# Patient Record
Sex: FEMALE | Race: BLACK OR AFRICAN AMERICAN | NOT HISPANIC OR LATINO | Employment: OTHER | URBAN - METROPOLITAN AREA
[De-identification: names, ages, dates, MRNs, and addresses within clinical notes are randomized per-mention and may not be internally consistent; named-entity substitution may affect disease eponyms.]

---

## 2017-05-12 DIAGNOSIS — J30.1 SEASONAL ALLERGIC RHINITIS DUE TO POLLEN: ICD-10-CM

## 2017-05-12 RX ORDER — FLUTICASONE PROPIONATE 50 MCG
SPRAY, SUSPENSION (ML) NASAL
Qty: 16 ML | Refills: 0 | OUTPATIENT
Start: 2017-05-12

## 2017-08-24 ENCOUNTER — OFFICE VISIT (OUTPATIENT)
Dept: FAMILY MEDICINE | Facility: CLINIC | Age: 57
End: 2017-08-24

## 2017-08-24 VITALS
HEART RATE: 76 BPM | HEIGHT: 65 IN | TEMPERATURE: 98.2 F | OXYGEN SATURATION: 97 % | BODY MASS INDEX: 24.32 KG/M2 | WEIGHT: 146 LBS | DIASTOLIC BLOOD PRESSURE: 70 MMHG | RESPIRATION RATE: 14 BRPM | SYSTOLIC BLOOD PRESSURE: 110 MMHG

## 2017-08-24 DIAGNOSIS — Z12.31 ENCOUNTER FOR SCREENING MAMMOGRAM FOR BREAST CANCER: ICD-10-CM

## 2017-08-24 DIAGNOSIS — J30.1 CHRONIC SEASONAL ALLERGIC RHINITIS DUE TO POLLEN: ICD-10-CM

## 2017-08-24 DIAGNOSIS — Z13.1 SCREENING FOR DIABETES MELLITUS: ICD-10-CM

## 2017-08-24 DIAGNOSIS — Z01.419 ENCOUNTER FOR GYNECOLOGICAL EXAMINATION WITHOUT ABNORMAL FINDING: Primary | ICD-10-CM

## 2017-08-24 DIAGNOSIS — Z11.59 NEED FOR HEPATITIS C SCREENING TEST: ICD-10-CM

## 2017-08-24 DIAGNOSIS — Z13.220 SCREENING CHOLESTEROL LEVEL: ICD-10-CM

## 2017-08-24 LAB
GLUCOSE SERPL-MCNC: 96 MG/DL (ref 60–99)
HEMOGLOBIN: 13 G/DL (ref 11.7–15.7)
MAMMOGRAM: NORMAL

## 2017-08-24 PROCEDURE — 36415 COLL VENOUS BLD VENIPUNCTURE: CPT | Performed by: FAMILY MEDICINE

## 2017-08-24 PROCEDURE — 86803 HEPATITIS C AB TEST: CPT | Mod: 90 | Performed by: FAMILY MEDICINE

## 2017-08-24 PROCEDURE — 80061 LIPID PANEL: CPT | Mod: 90 | Performed by: FAMILY MEDICINE

## 2017-08-24 PROCEDURE — 99396 PREV VISIT EST AGE 40-64: CPT | Performed by: FAMILY MEDICINE

## 2017-08-24 PROCEDURE — 85018 HEMOGLOBIN: CPT | Performed by: FAMILY MEDICINE

## 2017-08-24 PROCEDURE — 82947 ASSAY GLUCOSE BLOOD QUANT: CPT | Performed by: FAMILY MEDICINE

## 2017-08-24 RX ORDER — FLUTICASONE PROPIONATE 50 MCG
SPRAY, SUSPENSION (ML) NASAL
Qty: 3 BOTTLE | Refills: 3 | Status: SHIPPED | OUTPATIENT
Start: 2017-08-24 | End: 2018-08-20

## 2017-08-24 NOTE — MR AVS SNAPSHOT
After Visit Summary   8/24/2017    Jaki Lopez    MRN: [id number]           Patient Information     Date Of Birth          1960        Visit Information        Provider Department      8/24/2017 11:30 AM Luciana Palma MD [facility]        Today's Diagnoses     Encounter for gynecological examination without abnormal finding    -  1    Chronic seasonal allergic rhinitis due to pollen        Encounter for screening mammogram for breast cancer        Screening for diabetes mellitus        Screening cholesterol level        Need for hepatitis C screening test          Care Instructions    Total calcium intake of 1500 mgm/day, vitamin D 400-800IU/day and a regular weight bearing exercise program for prevention of osteoporosis is recommended treatment at this time.    Await labs            Follow-ups after your visit        Additional Services     RADIOLOGY REFERRAL       Your provider has referred you to: N: [facility] [place] [phone]   [email or web address]    Please be aware that coverage of these services is subject to the terms and limitations of your health insurance plan.  Call member services at your health plan with any benefit or coverage questions.      Please bring the following to your appointment:    >>   Any x-rays, CTs or MRIs which have been performed.  Contact the facility where they were done to arrange for  prior to your scheduled appointment.    >>   List of current medications   >>   This referral request   >>   Any documents/labs given to you for this referral    Prior authorization is required for MRI/MRA, CT, Dexa Scans and Worker's Compensation cases.                  Follow-up notes from your care team     Return in about 1 year (around 8/24/2018).      Future tests that were ordered for you today     Open Future Orders        Priority Expected Expires Ordered    Mammo Screening digital (bilat) Routine  8/24/2018 8/24/2017  "           Who to contact     If you have questions or need follow up information about today's clinic visit or your schedule please contact [facility] directly at [phone].  Normal or non-critical lab and imaging results will be communicated to you by MyChart, letter or phone within 4 business days after the clinic has received the results. If you do not hear from us within 7 days, please contact the clinic through MyChart or phone. If you have a critical or abnormal lab result, we will notify you by phone as soon as possible.  Submit refill requests through NONO or call your pharmacy and they will forward the refill request to us. Please allow 3 business days for your refill to be completed.          Additional Information About Your Visit        [employer] Information     NONO gives you secure access to your electronic health record. If you see a primary care provider, you can also send messages to your care team and make appointments. If you have questions, please call your primary care clinic.  If you do not have a primary care provider, please call [phone] and they will assist you.        Care EveryWhere ID     This is your Care EveryWhere ID. This could be used by other organizations to access your [facility] medical records  [id number]        Your Vitals Were     Pulse Temperature Respirations Height Pulse Oximetry BMI (Body Mass Index)    76 98.2  F (36.8  C) (Oral) 14 1.657 m (5' 5.25\") 97% 24.11 kg/m2       Blood Pressure from Last 3 Encounters:   08/24/17 110/70   08/20/15 110/70   08/18/14 110/70    Weight from Last 3 Encounters:   08/24/17 66.2 kg (146 lb)   08/20/15 65.8 kg (145 lb)   08/18/14 64.4 kg (142 lb)              We Performed the Following     CL AFF HEMOGLOBIN (BFP)     Glucose Fasting (BFP)     Hepatits C antibody (QUEST)     Lipid Profile (QUEST)     RADIOLOGY REFERRAL     VENOUS COLLECTION          Today's Medication Changes          These changes are " accurate as of: 8/24/17 12:30 PM.  If you have any questions, ask your nurse or doctor.               These medicines have changed or have updated prescriptions.        Dose/Directions    fluticasone 50 MCG/ACT spray   Commonly known as:  FLONASE   This may have changed:  See the new instructions.   Used for:  Chronic seasonal allergic rhinitis due to pollen   Changed by:  Luciana Palma MD        USE 2 SPRAYS IN EACH NOSTRIL EVERY DAY   Quantity:  3 Bottle   Refills:  3            Where to get your medicines      These medications were sent to [id number] - [place] - [address] AT [facility]  [address], [place] [address]     Phone:  [phone]     fluticasone 50 MCG/ACT spray                Primary Care Provider Office Phone # Fax #    Luciana Palma [phone] [address] [address]        Equal Access to Services     [facility]: Hadii aad ku hadasho Soomaali, waaxda luqadaha, qaybta kaalmada adeegyada, waxay idiin haymithcn snehal nam . So [facility] [phone].    ATENCIÓN: Si habla español, tiene a guerrero disposición servicios gratuitos de asistencia lingüística. [facility] [phone].    We comply with applicable federal civil rights laws and [place] laws. We do not discriminate on the basis of race, color, national origin, age, disability sex, sexual orientation or gender identity.            Thank you!     Thank you for choosing [facility]  for your care. Our goal is always to provide you with excellent care. Hearing back from our patients is one way we can continue to improve our services. Please take a few minutes to complete the written survey that you may receive in the mail after your visit with us. Thank you!             Your Updated Medication List - Protect others around you: Learn how to safely use, store and throw away your medicines at www.disposemymeds.org.           This list is accurate as of: 8/24/17 12:30 PM.  Always use your most recent med list.                   Brand Name Dispense Instructions for use Diagnosis    calcium carbonate-vitamin D 600-400 MG-UNIT Chew    CALTRATE 600+D     Take 1 chew tab by mouth 2 times daily.    Symptomatic menopausal or female climacteric states       fluticasone 50 MCG/ACT spray    FLONASE    3 Bottle    USE 2 SPRAYS IN EACH NOSTRIL EVERY DAY    Chronic seasonal allergic rhinitis due to pollen       vitamin E 400 UNIT capsule     3 MONTHS    ONE CAPSULE DAILY    Fibroadenosis of breast

## 2017-08-24 NOTE — PATIENT INSTRUCTIONS
Total calcium intake of 1500 mgm/day, vitamin D 400-800IU/day and a regular weight bearing exercise program for prevention of osteoporosis is recommended treatment at this time.    Await labs

## 2017-08-24 NOTE — NURSING NOTE
Patient is here for a full physical exam and pap.  Pre-Visit Screening :  Immunizations : up to date    Colonoscopy : is up to date  Mammogram : is up to date  Asthma Action Plan/Test : na  PHQ9/GAD7 : na  Pulse - regular  Medication Reconciliation: complete    BP done on the right arm, with a lg sized cuff.  Pulse - regular  My Chart - accepts    CLASSIFICATION OF OVERWEIGHT AND OBESITY BY BMI                         Obesity Class           BMI(kg/m2)  Underweight                                    < 18.5  Normal                                         18.5-24.9  Overweight                                     25.0-29.9  OBESITY                     I                  30.0-34.9                              II                 35.0-39.9  EXTREME OBESITY             III                >40                             Patient's  BMI Body mass index is 24.11 kg/(m^2).  http://hin.nhlbi.nih.gov/menuplanner/menu.cgi  Questioned patient about current smoking habits.  Pt. has never smoked.  ETOH screening:  Questions:  1-How often do you have a drink containing alcohol?                             2 times per month(s)  2-How many drinks containing alcohol do you have on a typical day when you are         Drinking?                              1   3- How often do you have 5 or more drinks on one occasion?                              never per never    Have you ever:  None of the patient's responses to the CAGE screening were positive / Negative CAGE score

## 2017-08-24 NOTE — PROGRESS NOTES
"SUBJECTIVE:  Jaki Lopez is an 56 year old  postmenopausal woman   who presents for annual gyn exam. Menopause at age 49. No   bleeding, spotting, or discharge noted.     Estrogen replacement therapy: never  MILTON exposure: no  History of abnormal Pap smear: No  Family history of uterine or ovarian cancer: No  Regular self breast exam: Yes  History of abnormal mammogram: Yes: benign adenoma  Family history of breast cancer: No  History of abnormal lipids: No    No past medical history on file.      Family History    Hypertension Father        Past Surgical History:  : BIOPSY OF BREAST, INCISIONAL     Comment: benign adenoma  3/2005: C STAPEDECTOMY     Comment: right    Current Outpatient Prescriptions:  fluticasone (FLONASE) 50 MCG/ACT nasal spray   calcium carbonate-vitamin D (CALTRATE 600+D) 600-400 MG-UNIT CHEW   VITAMIN E 400 UNIT OR CAPS   No current facility-administered medications for this visit.    -- No Known Allergies        Smoking status: Never Smoker    Smokeless tobacco: Never Used    Alcohol use Yes  0.0 oz/week    0 drink(s) per week            Review Of Systems  Ears/Nose/Throat: allergies  Respiratory: No shortness of breath, dyspnea on exertion, cough, or hemoptysis  Cardiovascular: negative  Gastrointestinal: negative  Genitourinary: negative    OBJECTIVE:  /70 (BP Location: Right arm, Cuff Size: Adult Large)  Pulse 76  Temp 98.2  F (36.8  C) (Oral)  Resp 14  Ht 1.657 m (5' 5.25\")  Wt 66.2 kg (146 lb)  SpO2 97%  BMI 24.11 kg/m2  General appearance: healthy, alert, no distress, cooperative and smiling  Skin: Skin color, texture, turgor normal. No rashes or lesions.  Ears: negative  Nose/Sinuses: Nares normal. Septum midline. Mucosa normal. No drainage or sinus tenderness.  Oropharynx: Lips, mucosa, and tongue normal. Teeth and gums normal.  Neck: Neck supple. No adenopathy. Thyroid symmetric, normal size,, Carotids without bruits.  Lungs: negative, Percussion normal. Good " diaphragmatic excursion. Lungs clear  Heart: negative, PMI normal. No lifts, heaves, or thrills. RRR. No murmurs, clicks gallops or rub  Breasts: Inspection negative. No nipple discharge or bleeding. No masses.  Abdomen: Abdomen soft, non-tender. BS normal. No masses, organomegaly  Pelvic: External genitalia and vagina normal. Bimanual and rectovaginal normal., positive findings:  vaginal mucosa atrophy  BMI : Body mass index is 24.11 kg/(m^2).    ASSESSMENT:(Z01.419) Encounter for gynecological examination without abnormal finding  (primary encounter diagnosis)  Plan: CL AFF HEMOGLOBIN (BFP), VENOUS COLLECTION        Total calcium intake of 1500 mgm/day, vitamin D 400-800IU/day and a regular weight bearing exercise program for prevention of osteoporosis is recommended treatment at this time.      (J30.1) Chronic seasonal allergic rhinitis due to pollen  Plan: fluticasone (FLONASE) 50 MCG/ACT spray        refilled    (Z12.31) Encounter for screening mammogram for breast cancer  Plan: RADIOLOGY REFERRAL, Mammo Screening digital         (bilat)            (Z13.1) Screening for diabetes mellitus  Plan: Glucose Fasting (BFP), VENOUS COLLECTION            (Z13.220) Screening cholesterol level  Plan: Lipid Profile (QUEST), VENOUS COLLECTION            (Z11.59) Need for hepatitis C screening test  Plan: VENOUS COLLECTION, Hepatits C antibody (QUEST)                Dx:  1)  Pap smear, mammogram  2)  Lipids at appropriate intervals    PE:  Reviewed health maintenance including diet, regular exercise,   estrogen replacement and periodic exams.

## 2017-08-25 LAB
CHOLEST SERPL-MCNC: 174 MG/DL
CHOLEST/HDLC SERPL: 2.6 (CALC)
HCV AB - QUEST: NORMAL
HDLC SERPL-MCNC: 67 MG/DL
LDLC SERPL CALC-MCNC: 93 MG/DL (CALC)
NONHDLC SERPL-MCNC: 107 MG/DL (CALC)
SIGNAL TO CUT OFF - QUEST: 0.03
TRIGL SERPL-MCNC: 58 MG/DL

## 2018-08-20 DIAGNOSIS — J30.1 CHRONIC SEASONAL ALLERGIC RHINITIS DUE TO POLLEN: ICD-10-CM

## 2018-08-20 RX ORDER — FLUTICASONE PROPIONATE 50 MCG
SPRAY, SUSPENSION (ML) NASAL
Qty: 1 BOTTLE | Refills: 0 | COMMUNITY
Start: 2018-08-20 | End: 2018-12-10

## 2018-08-20 RX ORDER — FLUTICASONE PROPIONATE 50 MCG
SPRAY, SUSPENSION (ML) NASAL
Qty: 48 ML | Refills: 0 | OUTPATIENT
Start: 2018-08-20

## 2018-08-20 NOTE — TELEPHONE ENCOUNTER
Ok refill of one month of fluticasone called into [facility]. Pt needs non fasting OV for refills.    [phone]

## 2018-10-26 ENCOUNTER — HEALTH MAINTENANCE LETTER (OUTPATIENT)
Age: 58
End: 2018-10-26

## 2018-11-01 ENCOUNTER — TRANSFERRED RECORDS (OUTPATIENT)
Dept: FAMILY MEDICINE | Facility: CLINIC | Age: 58
End: 2018-11-01

## 2018-12-10 ENCOUNTER — OFFICE VISIT (OUTPATIENT)
Dept: FAMILY MEDICINE | Facility: CLINIC | Age: 58
End: 2018-12-10

## 2018-12-10 VITALS
BODY MASS INDEX: 25.39 KG/M2 | WEIGHT: 152.4 LBS | OXYGEN SATURATION: 98 % | RESPIRATION RATE: 16 BRPM | HEIGHT: 65 IN | SYSTOLIC BLOOD PRESSURE: 116 MMHG | HEART RATE: 58 BPM | TEMPERATURE: 97.7 F | DIASTOLIC BLOOD PRESSURE: 72 MMHG

## 2018-12-10 DIAGNOSIS — Z12.4 ENCOUNTER FOR SCREENING FOR CERVICAL CANCER: ICD-10-CM

## 2018-12-10 DIAGNOSIS — Z13.220 SCREENING CHOLESTEROL LEVEL: ICD-10-CM

## 2018-12-10 DIAGNOSIS — Z13.1 SCREENING FOR DIABETES MELLITUS: ICD-10-CM

## 2018-12-10 DIAGNOSIS — J30.1 CHRONIC SEASONAL ALLERGIC RHINITIS DUE TO POLLEN: ICD-10-CM

## 2018-12-10 DIAGNOSIS — Z01.411 ENCOUNTER FOR GYNECOLOGICAL EXAMINATION WITH ABNORMAL FINDING: Primary | ICD-10-CM

## 2018-12-10 LAB
GLUCOSE SERPL-MCNC: 85 MG/DL (ref 60–99)
HEMOGLOBIN: 13.1 G/DL (ref 11.7–15.7)

## 2018-12-10 PROCEDURE — 36415 COLL VENOUS BLD VENIPUNCTURE: CPT | Performed by: FAMILY MEDICINE

## 2018-12-10 PROCEDURE — 88142 CYTOPATH C/V THIN LAYER: CPT | Mod: 90 | Performed by: FAMILY MEDICINE

## 2018-12-10 PROCEDURE — 80061 LIPID PANEL: CPT | Mod: 90 | Performed by: FAMILY MEDICINE

## 2018-12-10 PROCEDURE — 82947 ASSAY GLUCOSE BLOOD QUANT: CPT | Performed by: FAMILY MEDICINE

## 2018-12-10 PROCEDURE — 99396 PREV VISIT EST AGE 40-64: CPT | Performed by: FAMILY MEDICINE

## 2018-12-10 PROCEDURE — 85018 HEMOGLOBIN: CPT | Performed by: FAMILY MEDICINE

## 2018-12-10 RX ORDER — FLUTICASONE PROPIONATE 50 MCG
SPRAY, SUSPENSION (ML) NASAL
Qty: 3 BOTTLE | Refills: 3 | Status: SHIPPED | OUTPATIENT
Start: 2018-12-10 | End: 2018-12-10

## 2018-12-10 RX ORDER — INFLUENZA VIRUS VACCINE 15; 15; 15; 15 UG/.5ML; UG/.5ML; UG/.5ML; UG/.5ML
SUSPENSION INTRAMUSCULAR
Refills: 0 | COMMUNITY
Start: 2018-11-07 | End: 2020-07-22

## 2018-12-10 RX ORDER — FLUTICASONE PROPIONATE 50 MCG
SPRAY, SUSPENSION (ML) NASAL
Qty: 3 BOTTLE | Refills: 3 | Status: SHIPPED | OUTPATIENT
Start: 2018-12-10 | End: 2020-07-22

## 2018-12-10 SDOH — ECONOMIC STABILITY: FOOD INSECURITY: WITHIN THE PAST 12 MONTHS, THE FOOD YOU BOUGHT JUST DIDN'T LAST AND YOU DIDN'T HAVE MONEY TO GET MORE.: NEVER TRUE

## 2018-12-10 SDOH — ECONOMIC STABILITY: INCOME INSECURITY: HOW HARD IS IT FOR YOU TO PAY FOR THE VERY BASICS LIKE FOOD, HOUSING, MEDICAL CARE, AND HEATING?: NOT HARD AT ALL

## 2018-12-10 SDOH — ECONOMIC STABILITY: TRANSPORTATION INSECURITY
IN THE PAST 12 MONTHS, HAS LACK OF TRANSPORTATION KEPT YOU FROM MEETINGS, WORK, OR FROM GETTING THINGS NEEDED FOR DAILY LIVING?: NO

## 2018-12-10 SDOH — ECONOMIC STABILITY: FOOD INSECURITY: WITHIN THE PAST 12 MONTHS, YOU WORRIED THAT YOUR FOOD WOULD RUN OUT BEFORE YOU GOT MONEY TO BUY MORE.: NEVER TRUE

## 2018-12-10 SDOH — ECONOMIC STABILITY: TRANSPORTATION INSECURITY
IN THE PAST 12 MONTHS, HAS THE LACK OF TRANSPORTATION KEPT YOU FROM MEDICAL APPOINTMENTS OR FROM GETTING MEDICATIONS?: NO

## 2018-12-10 ASSESSMENT — MIFFLIN-ST. JEOR: SCORE: 1262.16

## 2018-12-10 NOTE — PATIENT INSTRUCTIONS
Total calcium intake of 1500 mgm/day, vitamin D 400-800IU/day and a regular weight bearing exercise program for prevention of osteoporosis is recommended treatment at this time.    Recheck 1 year

## 2018-12-10 NOTE — NURSING NOTE
Patient is here for a full physical exam.    Pre-Visit Screening :  Immunizations : up to date  Colon Screening : is up to date  Mammogram: up to date  Asthma Action Test/Plan : NA  PHQ9 :  PHQ 2 done today  GAD7 :  NA  Patient's  BMI Body mass index is 25.36 kg/m .  Questioned patient about current smoking habits.  Pt. has never smoked.  OK to leave a detailed voice message regarding today's visit Yes, phone # [phone]      ETOH screening:  Questions:  1-How often do you have a drink containing alcohol?                             2 times per month  2-How many drinks containing alcohol do you have on a typical day when you are         Drinking?                              1   3- How often do you have 5 or more drinks on one occasion?                              Never

## 2018-12-10 NOTE — PROGRESS NOTES
"SUBJECTIVE:  Jaki Lopez is an 58 year old  postmenopausal woman   who presents for annual gyn exam. Menopause at age 49. No   bleeding, spotting, or discharge noted.     Estrogen replacement therapy: never  MILTON exposure: no  History of abnormal Pap smear: No  Family history of uterine or ovarian cancer: No  Regular self breast exam: Yes  History of abnormal mammogram: Yes: benign adenoma  Family history of breast cancer: No  History of abnormal lipids: No    Past Medical History:  No date: Allergic rhinitis due to pollen  2013: Atrophic vaginitis    Review of patient's family history indicates:  Problem: Hypertension     Relation: Father         Age of Onset: (Not Specified)      Past Surgical History:  : BIOPSY OF BREAST, INCISIONAL     Comment:  benign adenoma  3/2005: C STAPEDECTOMY     Comment:  right    Current Outpatient Medications:  calcium carbonate-vitamin D (CALTRATE 600+D) 600-400 MG-UNIT CHEW   fluticasone (FLONASE) 50 MCG/ACT spray   VITAMIN E 400 UNIT OR CAPS   FLUARIX QUADRIVALENT 0.5 ML injection   No current facility-administered medications for this visit.    -- No Known Allergies     Social History   Tobacco Use     Smoking status: Never Smoker     Smokeless tobacco: Never Used   Alcohol use: Yes     Alcohol/week: 0.0 oz      Review Of Systems  Ears/Nose/Throat: allergies year around  Respiratory: No shortness of breath, dyspnea on exertion, cough, or hemoptysis  Cardiovascular: negative  Gastrointestinal: negative  Genitourinary: negative    OBJECTIVE:  /72 (BP Location: Right arm, Patient Position: Sitting, Cuff Size: Adult Large)   Pulse 58   Temp 97.7  F (36.5  C) (Oral)   Ht 1.651 m (5' 5\")   Wt 69.1 kg (152 lb 6.4 oz)   SpO2 98%   BMI 25.36 kg/m    General appearance: healthy, alert, no distress, cooperative and smiling  Skin: Skin color, texture, turgor normal. No rashes or lesions.  Ears: negative  Nose/Sinuses: Nares normal. Septum midline. Mucosa normal. No " drainage or sinus tenderness.  Oropharynx: Lips, mucosa, and tongue normal. Teeth and gums normal.  Neck: Neck supple. No adenopathy. Thyroid symmetric, normal size,, Carotids without bruits.  Lungs: negative, Percussion normal. Good diaphragmatic excursion. Lungs clear  Heart: negative, PMI normal. No lifts, heaves, or thrills. RRR. No murmurs, clicks gallops or rub  Breasts: Inspection negative. No nipple discharge or bleeding. No masses.  Abdomen: Abdomen soft, non-tender. BS normal. No masses, organomegaly  Pelvic: External genitalia and vagina normal. Bimanual and rectovaginal normal., positive findings:  vaginal mucosa atrophy  BMI : Body mass index is 25.36 kg/m .    ASSESSMENT:  (Z12.4) Encounter for screening for cervical cancer   (primary encounter diagnosis)  Plan: ThinPrep Pap and HPV (mRNA E6/E7)         ([facility])            (Z01.411) Encounter for gynecological examination with abnormal finding  Plan: VENIPUNC FNGR,HEEL,EAR [[id number]], HEMOGLOBIN         [79345.000]        Total calcium intake of 1500 mgm/day, vitamin D 400-800IU/day and a regular weight bearing exercise program for prevention of osteoporosis is recommended treatment at this time.  '    (Z13.1) Screening for diabetes mellitus  Plan: Glucose Fasting (BFP), VENIPUNC FNGR,HEEL,EAR         [14922]            (Z13.220) Screening cholesterol level  Plan: VENIPUNC FNGR,HEEL,EAR [[id number]], LIPID PANEL            (J30.1) Chronic seasonal allergic rhinitis due to pollen  Plan: fluticasone (FLONASE) 50 MCG/ACT nasal spray,         DISCONTINUED: fluticasone (FLONASE) 50 MCG/ACT         nasal spray        Refilled new pharmacy      Dx:  1)  Pap smear, mammogram  2)  Lipids at appropriate intervals    PE:  Reviewed health maintenance including diet, regular exercise,   estrogen replacement and periodic exams.

## 2018-12-11 LAB
CHOLEST SERPL-MCNC: 188 MG/DL
CHOLEST/HDLC SERPL: 2.8 (CALC)
HDLC SERPL-MCNC: 67 MG/DL
LDLC SERPL CALC-MCNC: 104 MG/DL (CALC)
NONHDLC SERPL-MCNC: 121 MG/DL (CALC)
TRIGL SERPL-MCNC: 78 MG/DL

## 2018-12-12 LAB
CLINICAL HISTORY - QUEST: NORMAL
COMMENT - QUEST: NORMAL
CYTOTECHNOLOGIST - QUEST: NORMAL
DESCRIPTIVE DIAGNOSIS - QUEST: NORMAL
LAST PAP DX - QUEST: NORMAL
LMP - QUEST: NORMAL
PREV BX DX - QUEST: NORMAL
SOURCE: NORMAL
STATEMENT OF ADEQUACY - QUEST: NORMAL

## 2019-09-27 ENCOUNTER — HEALTH MAINTENANCE LETTER (OUTPATIENT)
Age: 59
End: 2019-09-27

## 2019-10-18 ENCOUNTER — ALLIED HEALTH/NURSE VISIT (OUTPATIENT)
Dept: FAMILY MEDICINE | Facility: CLINIC | Age: 59
End: 2019-10-18

## 2019-10-18 DIAGNOSIS — Z23 NEED FOR VACCINATION: Primary | ICD-10-CM

## 2019-10-18 PROCEDURE — 90471 IMMUNIZATION ADMIN: CPT | Performed by: FAMILY MEDICINE

## 2019-10-18 PROCEDURE — 90686 IIV4 VACC NO PRSV 0.5 ML IM: CPT | Performed by: FAMILY MEDICINE

## 2020-03-15 ENCOUNTER — HEALTH MAINTENANCE LETTER (OUTPATIENT)
Age: 60
End: 2020-03-15

## 2020-03-29 ENCOUNTER — MYC MEDICAL ADVICE (OUTPATIENT)
Dept: FAMILY MEDICINE | Facility: CLINIC | Age: 60
End: 2020-03-29

## 2020-07-22 ENCOUNTER — OFFICE VISIT (OUTPATIENT)
Dept: FAMILY MEDICINE | Facility: CLINIC | Age: 60
End: 2020-07-22

## 2020-07-22 VITALS
DIASTOLIC BLOOD PRESSURE: 80 MMHG | BODY MASS INDEX: 25.55 KG/M2 | OXYGEN SATURATION: 98 % | HEIGHT: 66 IN | WEIGHT: 159 LBS | RESPIRATION RATE: 18 BRPM | HEART RATE: 55 BPM | TEMPERATURE: 98.2 F | SYSTOLIC BLOOD PRESSURE: 118 MMHG

## 2020-07-22 DIAGNOSIS — Z13.220 SCREENING CHOLESTEROL LEVEL: ICD-10-CM

## 2020-07-22 DIAGNOSIS — Z13.1 SCREENING FOR DIABETES MELLITUS: ICD-10-CM

## 2020-07-22 DIAGNOSIS — Z01.411 ENCOUNTER FOR GYNECOLOGICAL EXAMINATION WITH ABNORMAL FINDING: Primary | ICD-10-CM

## 2020-07-22 DIAGNOSIS — N95.2 ATROPHIC VAGINITIS: ICD-10-CM

## 2020-07-22 DIAGNOSIS — J30.1 CHRONIC SEASONAL ALLERGIC RHINITIS DUE TO POLLEN: ICD-10-CM

## 2020-07-22 DIAGNOSIS — Z23 NEED FOR DIPHTHERIA-TETANUS-PERTUSSIS (TDAP) VACCINE: ICD-10-CM

## 2020-07-22 DIAGNOSIS — Z12.11 SPECIAL SCREENING FOR MALIGNANT NEOPLASMS, COLON: ICD-10-CM

## 2020-07-22 LAB
CHOLEST SERPL-MCNC: 215 MG/DL (ref 0–199)
CHOLEST/HDLC SERPL: 3 {RATIO} (ref 0–5)
GLUCOSE SERPL-MCNC: 94 MG/DL (ref 60–99)
HDLC SERPL-MCNC: 68 MG/DL (ref 40–150)
HEMOGLOBIN: 13 G/DL (ref 11.7–15.7)
LDLC SERPL CALC-MCNC: 130 MG/DL (ref 0–130)
TRIGL SERPL-MCNC: 85 MG/DL (ref 0–149)

## 2020-07-22 PROCEDURE — 99396 PREV VISIT EST AGE 40-64: CPT | Mod: 25 | Performed by: FAMILY MEDICINE

## 2020-07-22 PROCEDURE — 82947 ASSAY GLUCOSE BLOOD QUANT: CPT | Performed by: FAMILY MEDICINE

## 2020-07-22 PROCEDURE — 90471 IMMUNIZATION ADMIN: CPT | Performed by: FAMILY MEDICINE

## 2020-07-22 PROCEDURE — 85018 HEMOGLOBIN: CPT | Performed by: FAMILY MEDICINE

## 2020-07-22 PROCEDURE — 36415 COLL VENOUS BLD VENIPUNCTURE: CPT | Performed by: FAMILY MEDICINE

## 2020-07-22 PROCEDURE — 90714 TD VACC NO PRESV 7 YRS+ IM: CPT | Performed by: FAMILY MEDICINE

## 2020-07-22 PROCEDURE — 80061 LIPID PANEL: CPT | Performed by: FAMILY MEDICINE

## 2020-07-22 RX ORDER — FLUTICASONE PROPIONATE 50 MCG
SPRAY, SUSPENSION (ML) NASAL
Qty: 16 G | Refills: 11 | Status: SHIPPED | OUTPATIENT
Start: 2020-07-22 | End: 2021-05-24

## 2020-07-22 SDOH — HEALTH STABILITY: MENTAL HEALTH: HOW OFTEN DO YOU HAVE A DRINK CONTAINING ALCOHOL?: 2-4 TIMES A MONTH

## 2020-07-22 SDOH — HEALTH STABILITY: MENTAL HEALTH: HOW OFTEN DO YOU HAVE 6 OR MORE DRINKS ON ONE OCCASION?: NEVER

## 2020-07-22 SDOH — HEALTH STABILITY: MENTAL HEALTH: HOW MANY STANDARD DRINKS CONTAINING ALCOHOL DO YOU HAVE ON A TYPICAL DAY?: 1 OR 2

## 2020-07-22 ASSESSMENT — MIFFLIN-ST. JEOR: SCORE: 1295.03

## 2020-07-22 NOTE — NURSING NOTE
Patient is here for a full physical exam.    Pre-Visit Screening :  Immunizations : up to date  Colon Screening : is due and ordered today  Mammogram: up to date  Asthma Action Test/Plan : NA  PHQ9 :  NA  GAD7 :  NA  Patient's  BMI Body mass index is 26.06 kg/m .  Questioned patient about current smoking habits.  Pt. has never smoked.  OK to leave a detailed voice message regarding today's visit Yes, phone # [phone]      ETOH screening: addressed in history

## 2020-07-22 NOTE — PATIENT INSTRUCTIONS
Colonoscopy recommended  Exercise encouraged  Follow up in 1 year  Glucose obtained  Hemoglobin obtained  Lipid profile obtained  Routine breast self-exam encouraged  Seat belt use encouraged  Sunscreen recommended  Tetanus booster given  Refills given

## 2020-07-22 NOTE — PROGRESS NOTES
"SUBJECTIVE:  Jaki Lopez is an 59 year old  postmenopausal woman   who presents for annual gyn exam. Menopause at age 49. No   bleeding, spotting, or discharge noted.     Estrogen replacement therapy: never  MILTON exposure: no  History of abnormal Pap smear: No  Family history of uterine or ovarian cancer: No  Regular self breast exam: Yes  History of abnormal mammogram: Yes: benign adenoma  Family history of breast cancer: No  History of abnormal lipids: No    Past Medical History:  No date: Allergic rhinitis due to pollen  2013: Atrophic vaginitis    Review of patient's family history indicates:  Problem: Hypertension      Relation: Father          Age of Onset: (Not Specified)      Past Surgical History:  : BIOPSY OF BREAST, INCISIONAL      Comment:  benign adenoma  3/2005: C STAPEDECTOMY      Comment:  right    Current Outpatient Medications:  calcium carbonate-vitamin D (CALTRATE 600+D) 600-400 MG-UNIT CHEW  fluticasone (FLONASE) 50 MCG/ACT nasal spray  VITAMIN E 400 UNIT OR CAPS    No current facility-administered medications for this visit.      -- No Known Allergies     Social History    Tobacco Use      Smoking status: Never Smoker      Smokeless tobacco: Never Used    Alcohol use: Yes      Alcohol/week: 0.0 standard drinks      Frequency: 2-4 times a month      Drinks per session: 1 or 2      Binge frequency: Never      Comment: rarely      Review Of Systems  Ears/Nose/Throat: allergies year around  Respiratory: No shortness of breath, dyspnea on exertion, cough, or hemoptysis  Cardiovascular: negative  Gastrointestinal: needs colonoscopy/ 10 years  Genitourinary: negative    OBJECTIVE:  /80 (BP Location: Left arm, Patient Position: Sitting, Cuff Size: Adult Large)   Pulse 55   Temp 98.2  F (36.8  C) (Oral)   Ht 1.664 m (5' 5.5\")   Wt 72.1 kg (159 lb)   LMP 2010   SpO2 98%   BMI 26.06 kg/m    General appearance: healthy, alert, no distress, cooperative and smiling  Skin: " Skin color, texture, turgor normal. No rashes or lesions.  Ears: negative  Nose/Sinuses: Nares normal. Septum midline. Mucosa normal. No drainage or sinus tenderness.  Oropharynx: Lips, mucosa, and tongue normal. Teeth and gums normal.  Neck: Neck supple. No adenopathy. Thyroid symmetric, normal size,, Carotids without bruits.  Lungs: negative, Percussion normal. Good diaphragmatic excursion. Lungs clear  Heart: negative, PMI normal. No lifts, heaves, or thrills. RRR. No murmurs, clicks gallops or rub  Breasts: Inspection negative. No nipple discharge or bleeding. No masses.  Abdomen: Abdomen soft, non-tender. BS normal. No masses, organomegaly  Pelvic: External genitalia and vagina normal. Bimanual and rectovaginal normal., positive findings:  vaginal mucosa atrophy  BMI : Body mass index is 26.06 kg/m .    ASSESSMENT:(Z01.411) Encounter for gynecological examination with abnormal finding  (primary encounter diagnosis)  Plan: CL AFF HEMOGLOBIN (BFP), VENOUS COLLECTION        Colonoscopy recommended  Exercise encouraged  Follow up in 1 year  Glucose obtained  Hemoglobin obtained  Lipid profile obtained  Routine breast self-exam encouraged  Seat belt use encouraged  Sunscreen recommended  Tetanus booster given  Refills given    (N95.2) Atrophic vaginitis  Plan: I have reviewed the patient's medical history in detail and updated the computerized patient record.      (J30.1) Chronic seasonal allergic rhinitis due to pollen  Comment: refilled  Plan: fluticasone (FLONASE) 50 MCG/ACT nasal spray        Potential medication side effects were discussed with the patient; let me know if any occur.      (Z13.1) Screening for diabetes mellitus  Plan: Glucose Fasting (BFP), VENOUS COLLECTION            (Z13.220) Screening cholesterol level  Plan: Lipid Panel (BFP), VENOUS COLLECTION            (Z12.11) Special screening for malignant neoplasms, colon  Plan: GASTROENTEROLOGY ADULT REF PROCEDURE ONLY            (Z23) Need for  diphtheria-tetanus-pertussis (Tdap) vaccine  Plan: C TD PRSERV FREE >=7 YRS ADS IM                Dx:  1)  Pap smear, mammogram  2)  Lipids at appropriate intervals    PE:  Reviewed health maintenance including diet, regular exercise,   estrogen replacement and periodic exams.

## 2020-10-13 ENCOUNTER — ALLIED HEALTH/NURSE VISIT (OUTPATIENT)
Dept: FAMILY MEDICINE | Facility: CLINIC | Age: 60
End: 2020-10-13

## 2020-10-13 DIAGNOSIS — Z23 NEED FOR VACCINATION: Primary | ICD-10-CM

## 2020-10-13 PROCEDURE — 90686 IIV4 VACC NO PRSV 0.5 ML IM: CPT | Performed by: FAMILY MEDICINE

## 2020-10-13 PROCEDURE — 90471 IMMUNIZATION ADMIN: CPT | Performed by: FAMILY MEDICINE

## 2021-03-13 ENCOUNTER — HEALTH MAINTENANCE LETTER (OUTPATIENT)
Age: 61
End: 2021-03-13

## 2021-04-19 ENCOUNTER — TRANSFERRED RECORDS (OUTPATIENT)
Dept: FAMILY MEDICINE | Facility: CLINIC | Age: 61
End: 2021-04-19

## 2021-04-19 LAB — MAMMOGRAM: NORMAL

## 2021-05-24 DIAGNOSIS — J30.1 CHRONIC SEASONAL ALLERGIC RHINITIS DUE TO POLLEN: ICD-10-CM

## 2021-05-24 RX ORDER — FLUTICASONE PROPIONATE 50 MCG
SPRAY, SUSPENSION (ML) NASAL
Qty: 16 G | Refills: 0 | Status: SHIPPED | OUTPATIENT
Start: 2021-05-24 | End: 2021-07-28

## 2021-05-24 RX ORDER — AZELASTINE 1 MG/ML
2 SPRAY, METERED NASAL 2 TIMES DAILY
Qty: 30 ML | Refills: 0 | Status: SHIPPED | OUTPATIENT
Start: 2021-05-24 | End: 2022-05-25

## 2021-05-24 NOTE — TELEPHONE ENCOUNTER
Pt would like this refilled. She is due back in July for her annual CPX.    Pending Prescriptions:                       Disp   Refills    fluticasone (FLONASE) 50 MCG/ACT nasal sp*16 g   0            Sig: USE 2 SPRAYS IN EACH NOSTRIL EVERY DAY

## 2021-05-24 NOTE — TELEPHONE ENCOUNTER
[facility] called stating that the preferred nasal spray by patient is the following:    Please advise, thanks.      Pending Prescriptions:                       Disp   Refills    azelastine (ASTELIN) 0.1 % nasal spray    30 mL  0            Sig: Spray 2 sprays into both nostrils 2 times daily    Signed Prescriptions:                        Disp   Refills    fluticasone (FLONASE) 50 MCG/ACT nasal spr*16 g   0        Sig: USE 2 SPRAYS IN EACH NOSTRIL EVERY DAY  Authorizing Provider: STEPH TOBAR

## 2021-07-22 ENCOUNTER — OFFICE VISIT (OUTPATIENT)
Dept: FAMILY MEDICINE | Facility: CLINIC | Age: 61
End: 2021-07-22

## 2021-07-22 VITALS
BODY MASS INDEX: 25.39 KG/M2 | SYSTOLIC BLOOD PRESSURE: 120 MMHG | DIASTOLIC BLOOD PRESSURE: 78 MMHG | OXYGEN SATURATION: 96 % | HEIGHT: 66 IN | HEART RATE: 54 BPM | WEIGHT: 158 LBS | RESPIRATION RATE: 20 BRPM | TEMPERATURE: 97.9 F

## 2021-07-22 DIAGNOSIS — R94.31 NONSPECIFIC ABNORMAL ELECTROCARDIOGRAM (ECG) (EKG): ICD-10-CM

## 2021-07-22 DIAGNOSIS — K21.00 GASTROESOPHAGEAL REFLUX DISEASE WITH ESOPHAGITIS WITHOUT HEMORRHAGE: ICD-10-CM

## 2021-07-22 DIAGNOSIS — R07.89 ATYPICAL CHEST PAIN: Primary | ICD-10-CM

## 2021-07-22 DIAGNOSIS — Z12.11 SPECIAL SCREENING FOR MALIGNANT NEOPLASMS, COLON: ICD-10-CM

## 2021-07-22 LAB
% GRANULOCYTES: 53.3 %
ALBUMIN SERPL-MCNC: 4.5 G/DL (ref 3.6–5.1)
ALBUMIN/GLOB SERPL: 2 {RATIO} (ref 1–2.5)
ALP SERPL-CCNC: 104 U/L (ref 33–130)
ALT 1742-6: 12 U/L (ref 0–32)
AST 1920-8: 10 U/L (ref 0–35)
BILIRUB SERPL-MCNC: 0.8 MG/DL (ref 0.2–1.2)
BUN SERPL-MCNC: 11 MG/DL (ref 7–25)
BUN/CREATININE RATIO: 12.8 (ref 6–22)
CALCIUM SERPL-MCNC: 9.9 MG/DL (ref 8.6–10.3)
CHLORIDE SERPLBLD-SCNC: 103.4 MMOL/L (ref 98–110)
CO2 SERPL-SCNC: 30.6 MMOL/L (ref 20–32)
CREAT SERPL-MCNC: 0.86 MG/DL (ref 0.6–1.3)
GLOBULIN, CALCULATED - QUEST: 2.3 (ref 1.9–3.7)
GLUCOSE SERPL-MCNC: 94 MG/DL (ref 60–99)
HCT VFR BLD AUTO: 39.3 % (ref 35–47)
HELIOBACTER PYLORI ANTIBODY SCREEN: NORMAL
HEMOGLOBIN: 12.8 G/DL (ref 11.7–15.7)
LYMPHOCYTES NFR BLD AUTO: 38.8 %
MCH RBC QN AUTO: 29.6 PG (ref 26–33)
MCHC RBC AUTO-ENTMCNC: 32.6 G/DL (ref 31–36)
MCV RBC AUTO: 91 FL (ref 78–100)
MONOCYTES NFR BLD AUTO: 7.9 %
PLATELET COUNT - QUEST: 303 10^9/L (ref 150–375)
POTASSIUM SERPL-SCNC: 4.22 MMOL/L (ref 3.5–5.3)
PROT SERPL-MCNC: 6.8 G/DL (ref 6.1–8.1)
RBC # BLD AUTO: 4.32 10*12/L (ref 3.8–5.2)
SODIUM SERPL-SCNC: 140.6 MMOL/L (ref 135–146)
WBC # BLD AUTO: 7.3 10*9/L (ref 4–11)

## 2021-07-22 PROCEDURE — 85025 COMPLETE CBC W/AUTO DIFF WBC: CPT | Performed by: FAMILY MEDICINE

## 2021-07-22 PROCEDURE — 36415 COLL VENOUS BLD VENIPUNCTURE: CPT | Performed by: FAMILY MEDICINE

## 2021-07-22 PROCEDURE — 86677 HELICOBACTER PYLORI ANTIBODY: CPT | Performed by: FAMILY MEDICINE

## 2021-07-22 PROCEDURE — 99215 OFFICE O/P EST HI 40 MIN: CPT | Mod: 25 | Performed by: FAMILY MEDICINE

## 2021-07-22 PROCEDURE — 93000 ELECTROCARDIOGRAM COMPLETE: CPT | Performed by: FAMILY MEDICINE

## 2021-07-22 PROCEDURE — 80053 COMPREHEN METABOLIC PANEL: CPT | Performed by: FAMILY MEDICINE

## 2021-07-22 RX ORDER — PANTOPRAZOLE SODIUM 20 MG/1
40 TABLET, DELAYED RELEASE ORAL DAILY
Qty: 30 TABLET | Refills: 0 | Status: SHIPPED | OUTPATIENT
Start: 2021-07-22 | End: 2022-05-25

## 2021-07-22 ASSESSMENT — MIFFLIN-ST. JEOR: SCORE: 1285.49

## 2021-07-22 NOTE — PROGRESS NOTES
SUBJECTIVE: 60 year old female complaining of anterior chest wall pressure and heartburn for 1 week(s).   The patient describes TUMS always makes it better. Last week walking on a treadmill and the discomfort/ bloating started again making her stop exercising. Tums again helped when she got home. This worried her and appointment made.   The patient denies a history of dyspnea, palpitations, syncope, orthopnea, edema or paroxysmal nocturnal dyspnea. No family history. Normal BP, cholesterol and no smoking history  .   Smoking history: No.   Relevant past medical history: positive for weight gain since retiring and more anxiety.    OBJECTIVE: The patient appears healthy, alert, no distress, cooperative, smiling and over weight.   EARS: negative  NOSE/SINUS: Nares normal. Septum midline. Mucosa normal. No drainage or sinus tenderness.   THROAT: normal   NECK:Neck supple. No adenopathy. Thyroid symmetric, normal size,, Carotids without bruits.   CHEST: Regular rate and  rhythm. S1 and S2 normal, no murmurs, clicks, gallops or rubs. No edema or JVD. Chest is clear; no wheezes or rales.  ABD: The abdomen is soft without tenderness, guarding, mass or organomegaly. Bowel sounds are normal. No CVA tenderness or inguinal adenopathy noted.  Ext: The lower extremities are normal and reveal no sign of DVT. Calves and thighs are soft and non tender, color is normal, no swelling or redness. Jacinda's sign is negative.  Pedal pulses are normal.    CBC: WNL  H pylori: neg    Good response to Maalox cocktail  EKG: probable normal variant with LVH criteria/ no acute changes including ST changes        ASSESSMENT: (R07.89) Atypical chest pain  (primary encounter diagnosis)  Comment: schedule stress echo  Plan: EKG 12-lead complete w/read - Clinics, VENOUS         COLLECTION, HEMOGRAM PLATELET DIFF (BFP),         Echocardiogram Exercise Stress        Any worrisome symptoms 911/ ER. Cardiology will arrange testing    (K21.00)  Gastroesophageal reflux disease with esophagitis without hemorrhage  Comment: read handout/ life style changes  Plan: HELICOBACTER PYLORI (BFP), VENOUS COLLECTION,         HEMOGRAM PLATELET DIFF (BFP), Comprehensive         Metobolic Panel (BFP), pantoprazole (PROTONIX)         20 MG EC tablet        Potential medication side effects were discussed with the patient; let me know if any occur.  Monitor response    (R94.31) Nonspecific abnormal electrocardiogram (ECG) (EKG)  Plan: Echocardiogram Exercise Stress            (Z12.11) Special screening for malignant neoplasms, colon  Plan: Adult Gastro Ref - Procedure Only        Encouraged.    Total time spent with patient today including visit and non face to face time 40 minutes.

## 2021-07-22 NOTE — PATIENT INSTRUCTIONS
Atypical chest pain  (primary encounter diagnosis)  Comment: schedule stress echo  Plan: EKG 12-lead complete w/read - Clinics, VENOUS         COLLECTION, HEMOGRAM PLATELET DIFF (BFP),         Echocardiogram Exercise Stress        Any worrisome symptoms 911/ ER. Cardiology will arrange testing    (K21.00) Gastroesophageal reflux disease with esophagitis without hemorrhage  Comment: read handout/ life style changes  Plan: HELICOBACTER PYLORI (BFP), VENOUS COLLECTION,         HEMOGRAM PLATELET DIFF (BFP), Comprehensive         Metobolic Panel (BFP), pantoprazole (PROTONIX)         20 MG EC tablet        Potential medication side effects were discussed with the patient; let me know if any occur.  Monitor response

## 2021-07-22 NOTE — NURSING NOTE
Chief Complaint   Patient presents with     Heartburn     noticed it had started about one week ago. Notes a burning in throat, has been taking tums for relief.     Pre-Visit Screening:  Immunizations: Due for Zoster, advised to go to pharmacy  Colonoscopy: due and ordered   Mammogram: UTD  Asthma Action Test/Plan: NA  PHQ9: PHQ2 done today  GAD7: no concerns   Questioned patient about current smoking habits Pt. Never smoker  OK to leave a detailed message on voice mail for today's visit  YES, phone # [phone]

## 2021-07-28 DIAGNOSIS — J30.1 CHRONIC SEASONAL ALLERGIC RHINITIS DUE TO POLLEN: ICD-10-CM

## 2021-07-28 RX ORDER — FLUTICASONE PROPIONATE 50 MCG
SPRAY, SUSPENSION (ML) NASAL
Qty: 16 G | Refills: 1 | Status: SHIPPED | OUTPATIENT
Start: 2021-07-28 | End: 2021-11-08

## 2021-07-28 NOTE — TELEPHONE ENCOUNTER
Last filled 5/24/2021    Pending Prescriptions:                       Disp   Refills    fluticasone (FLONASE) 50 MCG/ACT nasal sp*16 g   1            Sig: Sniff 2 SPRAYS IN EACH NOSTRIL once EVERY DAY

## 2021-08-02 ENCOUNTER — HOSPITAL ENCOUNTER (OUTPATIENT)
Dept: CARDIOLOGY | Facility: CLINIC | Age: 61
Discharge: HOME | End: 2021-08-02
Attending: FAMILY MEDICINE | Admitting: FAMILY MEDICINE
Payer: COMMERCIAL

## 2021-08-02 DIAGNOSIS — R07.89 ATYPICAL CHEST PAIN: ICD-10-CM

## 2021-08-02 DIAGNOSIS — R94.31 NONSPECIFIC ABNORMAL ELECTROCARDIOGRAM (ECG) (EKG): ICD-10-CM

## 2021-08-02 PROCEDURE — 93321 DOPPLER ECHO F-UP/LMTD STD: CPT | Mod: TC

## 2021-08-02 PROCEDURE — 93321 DOPPLER ECHO F-UP/LMTD STD: CPT | Mod: 26 | Performed by: INTERNAL MEDICINE

## 2021-08-02 PROCEDURE — 93016 CV STRESS TEST SUPVJ ONLY: CPT | Performed by: INTERNAL MEDICINE

## 2021-08-02 PROCEDURE — 93325 DOPPLER ECHO COLOR FLOW MAPG: CPT | Mod: 26 | Performed by: INTERNAL MEDICINE

## 2021-08-02 PROCEDURE — 93018 CV STRESS TEST I&R ONLY: CPT | Performed by: INTERNAL MEDICINE

## 2021-08-02 PROCEDURE — 93350 STRESS TTE ONLY: CPT | Mod: TC

## 2021-08-02 PROCEDURE — 93350 STRESS TTE ONLY: CPT | Mod: 26 | Performed by: INTERNAL MEDICINE

## 2021-08-28 ENCOUNTER — HEALTH MAINTENANCE LETTER (OUTPATIENT)
Age: 61
End: 2021-08-28

## 2021-10-18 ENCOUNTER — ALLIED HEALTH/NURSE VISIT (OUTPATIENT)
Dept: FAMILY MEDICINE | Facility: CLINIC | Age: 61
End: 2021-10-18

## 2021-10-18 DIAGNOSIS — Z23 NEED FOR VACCINATION: Primary | ICD-10-CM

## 2021-10-18 PROCEDURE — 90471 IMMUNIZATION ADMIN: CPT | Performed by: FAMILY MEDICINE

## 2021-10-18 PROCEDURE — 90686 IIV4 VACC NO PRSV 0.5 ML IM: CPT | Performed by: FAMILY MEDICINE

## 2021-11-06 DIAGNOSIS — J30.1 CHRONIC SEASONAL ALLERGIC RHINITIS DUE TO POLLEN: ICD-10-CM

## 2021-11-08 RX ORDER — FLUTICASONE PROPIONATE 50 MCG
SPRAY, SUSPENSION (ML) NASAL
Qty: 16 G | Refills: 0 | Status: SHIPPED | OUTPATIENT
Start: 2021-11-08 | End: 2022-05-25

## 2021-11-08 NOTE — TELEPHONE ENCOUNTER
Please advise pt last seen 07/22/21 for another issue.    Jaki Lopez is requesting a refill of:    Pending Prescriptions:                       Disp   Refills    fluticasone (FLONASE) 50 MCG/ACT nasal sp*16 g   0            Sig: Sniff 2 SPRAYS IN EACH NOSTRIL once EVERY DAY

## 2021-12-12 DIAGNOSIS — J30.1 CHRONIC SEASONAL ALLERGIC RHINITIS DUE TO POLLEN: ICD-10-CM

## 2021-12-13 RX ORDER — FLUTICASONE PROPIONATE 50 MCG
SPRAY, SUSPENSION (ML) NASAL
Qty: 16 G | Refills: 0 | COMMUNITY
Start: 2021-12-13

## 2021-12-13 NOTE — TELEPHONE ENCOUNTER
Jaki Lopez is requesting a refill of:    Refused Prescriptions:                       Disp   Refills    fluticasone (FLONASE) 50 MCG/ACT nasal spr*16 g   0        Sig: Sniff 2 SPRAYS IN EACH NOSTRIL once EVERY DAY  Refused By: CHANTELLE TORRES  Reason for Refusal: Patient needs appointment    Pt due for OV

## 2022-05-24 ENCOUNTER — TRANSFERRED RECORDS (OUTPATIENT)
Dept: FAMILY MEDICINE | Facility: CLINIC | Age: 62
End: 2022-05-24

## 2022-05-25 ENCOUNTER — OFFICE VISIT (OUTPATIENT)
Dept: FAMILY MEDICINE | Facility: CLINIC | Age: 62
End: 2022-05-25

## 2022-05-25 VITALS
HEART RATE: 62 BPM | TEMPERATURE: 97.2 F | WEIGHT: 162 LBS | OXYGEN SATURATION: 96 % | HEIGHT: 66 IN | SYSTOLIC BLOOD PRESSURE: 128 MMHG | BODY MASS INDEX: 26.03 KG/M2 | DIASTOLIC BLOOD PRESSURE: 86 MMHG

## 2022-05-25 DIAGNOSIS — Z13.228 SCREENING FOR METABOLIC DISORDER: ICD-10-CM

## 2022-05-25 DIAGNOSIS — Z01.419 ENCOUNTER FOR GYNECOLOGICAL EXAMINATION WITHOUT ABNORMAL FINDING: Primary | ICD-10-CM

## 2022-05-25 DIAGNOSIS — Z12.4 SCREENING FOR CERVICAL CANCER: ICD-10-CM

## 2022-05-25 DIAGNOSIS — N84.1 CERVICAL POLYP: ICD-10-CM

## 2022-05-25 DIAGNOSIS — Z13.220 SCREENING FOR LIPID DISORDERS: ICD-10-CM

## 2022-05-25 DIAGNOSIS — J30.1 CHRONIC SEASONAL ALLERGIC RHINITIS DUE TO POLLEN: ICD-10-CM

## 2022-05-25 PROBLEM — J31.0 CHRONIC RHINITIS: Status: ACTIVE | Noted: 2022-05-25

## 2022-05-25 LAB
BUN SERPL-MCNC: 10 MG/DL (ref 7–25)
BUN/CREATININE RATIO: 11.9 (ref 6–22)
CALCIUM SERPL-MCNC: 9.3 MG/DL (ref 8.6–10.3)
CHLORIDE SERPLBLD-SCNC: 102.4 MMOL/L (ref 98–110)
CHOLEST SERPL-MCNC: 208 MG/DL (ref 0–199)
CHOLEST/HDLC SERPL: 3 {RATIO} (ref 0–5)
CO2 SERPL-SCNC: 29.7 MMOL/L (ref 20–32)
CREAT SERPL-MCNC: 0.84 MG/DL (ref 0.6–1.3)
GLUCOSE SERPL-MCNC: 93 MG/DL (ref 60–99)
HDLC SERPL-MCNC: 65 MG/DL (ref 40–150)
LDLC SERPL CALC-MCNC: 126 MG/DL (ref 0–130)
POTASSIUM SERPL-SCNC: 4.03 MMOL/L (ref 3.5–5.3)
SODIUM SERPL-SCNC: 138 MMOL/L (ref 135–146)
TRIGL SERPL-MCNC: 84 MG/DL (ref 0–149)

## 2022-05-25 PROCEDURE — 80048 BASIC METABOLIC PNL TOTAL CA: CPT | Performed by: PHYSICIAN ASSISTANT

## 2022-05-25 PROCEDURE — 99396 PREV VISIT EST AGE 40-64: CPT | Performed by: PHYSICIAN ASSISTANT

## 2022-05-25 PROCEDURE — 36415 COLL VENOUS BLD VENIPUNCTURE: CPT | Performed by: PHYSICIAN ASSISTANT

## 2022-05-25 PROCEDURE — 80061 LIPID PANEL: CPT | Performed by: PHYSICIAN ASSISTANT

## 2022-05-25 RX ORDER — MULTIVITAMIN
1 TABLET ORAL DAILY
COMMUNITY
Start: 2022-05-25

## 2022-05-25 RX ORDER — AZELASTINE 1 MG/ML
2 SPRAY, METERED NASAL 2 TIMES DAILY
Qty: 30 ML | Refills: 0 | Status: CANCELLED | OUTPATIENT
Start: 2022-05-25

## 2022-05-25 RX ORDER — FLUTICASONE PROPIONATE 50 MCG
2 SPRAY, SUSPENSION (ML) NASAL DAILY
Qty: 48 G | Refills: 3 | Status: SHIPPED | OUTPATIENT
Start: 2022-05-25 | End: 2023-08-31

## 2022-05-25 NOTE — PROGRESS NOTES
Chief Complaint:  Physical Exam    SUBJECTIVE:   Jaki Lopez is a 61 year old female presents for routine health maintenance.    Current concerns:   Would like refills of Flonase nasal spray.     Seen for GI symptoms 7/2021. Has not tried PPI. Agrees to try Prilosec. Will need GI focused visit if ongoing.     Some bilateral knee pain. Not ready to see ortho. May try Tylenol arthritis, topical. Will need knee focused visit if ongoing. Mentioned Dr. Frank as good option.     Menses are absent    Patient's last menstrual period was 03/04/2010.  Was last Pap smear normal: Yes  Due for mammogram:  No    Body mass index is 26.55 kg/m .    Present exercise habits:  3-5 times/week, walking treadmill.   Present dietary habits:  eats regular meals and follows a balanced nutrition diet. Retired last year, so some worsening with diet.     Calcium intake: 2 servings daily  Vit D intake: is not taking supplement    Is the patient a smoker? No  If yes, smoking cessation advised and counseling provided.     Cardiovascular risk factors: none    Over the past few weeks, have you felt down or depressed? Little interest or pleasure in doing things? No concerns    If in a relationship are there any Domestic violence concern: No    Last dental appointment:  this year  Last optical appointment:  this year    Was the patient born between [birth year] and has not had Hep C testing?  Patient has already been tested    I have reviewed the following histories: Past Medical History, Past Surgical History, Social History, Family History, Problem List, Medication List and Allergies    Past Medical History:   Diagnosis Date     Allergic rhinitis due to pollen      Atrophic vaginitis 6/19/2013     Family History   Problem Relation Age of Onset     Hypertension Father      Social History     Socioeconomic History     Marital status:      Spouse name: Not on file     Number of children: 2     Years of education: Not on file     Highest  education level: Not on file   Occupational History     Not on file   Tobacco Use     Smoking status: Never Smoker     Smokeless tobacco: Never Used   Substance and Sexual Activity     Alcohol use: Yes     Comment: rarely, a glass of wine per month     Drug use: Yes     Frequency: 1.0 times per week     Sexual activity: Yes     Partners: Male     Birth control/protection: Condom, Pill, Post-menopausal   Other Topics Concern     Not on file   Social History Narrative     Not on file     Social Determinants of Health     Financial Resource Strain: Not on file   Food Insecurity: Not on file   Transportation Needs: Not on file   Physical Activity: Not on file   Stress: Not on file   Social Connections: Not on file   Intimate Partner Violence: Not on file   Housing Stability: Not on file     Past Surgical History:   Procedure Laterality Date     BIOPSY OF BREAST, INCISIONAL  1996    benign adenoma     ZZC STAPEDECTOMY  3/2005    right       ROS:  E/M: NEGATIVE for ear, nose, mouth and throat problems  R: NEGATIVE for significant/chronic cough or SOB  CV: NEGATIVE for chest pain or palpitations  GI: NEGATIVE for abdominal pain, chronic diarrhea or constipation  :  NEGATIVE for dysuria, hematuria or vaginal discharge. No sexual health concerns.       Current Outpatient Medications   Medication     azelastine (ASTELIN) 0.1 % nasal spray     fluticasone (FLONASE) 50 MCG/ACT nasal spray     VITAMIN E 400 UNIT OR CAPS     pantoprazole (PROTONIX) 20 MG EC tablet     No current facility-administered medications for this visit.       Patient Active Problem List    Diagnosis Date Noted     ACP (advance care planning) 08/18/2014     Priority: Medium     Advance Care Planning 8/20/2015: ACP Review and Resources Provided:  Reviewed chart for advance care plan.  Jaki Lopez has no plan or code status on file. Discussed available resources and provided with information. Confirmed code status reflects current choices pending  "further ACP discussions.  Confirmed/documented legally designated decision maker(s). Added by Rachel Bland               [facility] 09/17/2013     Priority: Medium     State Tier Level:  Tier 0  Status:  n/a  Care Coordinator:   See Letters for [facility] Care Plan           Atrophic vaginitis 06/19/2013     Priority: Medium     Symptomatic menopausal or female climacteric states 06/19/2013     Priority: Medium     Osteopenia 03/23/2011     Priority: Medium     Otosclerosis 09/28/2002     Priority: Medium     Problem list name updated by automated process. Provider to review       Allergic rhinitis due to pollen      Priority: Medium     Fibroadenosis of breast      Priority: Medium         OBJECTIVE:  /86 (BP Location: Left arm, Patient Position: Sitting, Cuff Size: Adult Regular)   Pulse 62   Temp 97.2  F (36.2  C) (Temporal)   Ht 1.664 m (5' 5.5\")   Wt 73.5 kg (162 lb)   LMP 03/04/2010   SpO2 96%   BMI 26.55 kg/m        General: 61 year old female who appears her stated age. Vital signs noted  Head: Normocephalic  Eyes: pupils equal round reactive to light and accomodation, extra ocular movements intact  Ears: external canals and TMs free of abnormalities  Nose: patent, without mucosal abnormalities  Mouth and throat: without erythema or lesions of the mucosa  Neck: supple, without adenopathy or thyromegaly  Lungs: clear to auscultation, no wheezing or crackles  Breasts: skin without rash, no dominant mass, no nipple discharge, or axillary adenopathy  Cv: regular rate and rhythm, normal s1 and s2 without murmur or click  Abd: soft, non-tender, no masses, no hepatomegaly or splenomegaly.   (female): normal female external genitalia, normal urethral meatus, vaginal mucosa, normal cervix/adnexa/uterus without masses or discharge, other than cervical polyp noted. 12 o'clock.   Ms: normal muscle tone & symmetry  Skin: clear to inspection and with no palpable abnormalities.  Neuro: sensation and motor " "function grossly intact; cranial nerves without obvious abnormalities.    ASSESSMENT/PLAN:    Encounter for gynecological examination without abnormal finding  Jaki is doing well today. Will update fasting labs, pap, and send MyChart with results. Refilled Flonase.    Chronic seasonal allergic rhinitis due to pollen  - fluticasone (FLONASE) 50 MCG/ACT nasal spray; Spray 2 sprays into both nostrils daily    Screening for metabolic disorder  - VENOUS COLLECTION  - Basic Metabolic Panel (BFP)    Screening for lipid disorders  - VENOUS COLLECTION  - Lipid Panel (BFP)    Screening for cervical cancer  - ThinPrep Pap and HPV (mRNa E6/E7) HPV always run([facility])       reports that she has never smoked. She has never used smokeless tobacco.      Estimated body mass index is 26.55 kg/m  as calculated from the following:    Height as of this encounter: 1.664 m (5' 5.5\").    Weight as of this encounter: 73.5 kg (162 lb).  Weight management plan: Discussed healthy diet and exercise guidelines      Labs pending:      Fasting glucose      Fasting lipids       Pap smear  Meds Suggested:      Vitamin D       Calcium  Tests Recommended:      Regular Dental Examinations        Eye exam  Behavior Modifications:       Cardiovascular exercise 3 times per week--enough to get your Target Heart rate  Other recommendations:    Health Care directive     BMI noted and discussed      Regular breast exam     Encouraged My Chart    Counseling Resources:  ATP IV Guidelines  Pooled Cohorts Equation Calculator  Breast Cancer Risk Calculator  FRAX Risk Assessment  ICSI Preventive Guidelines  Dietary Guidelines for Americans, 2010  USDA's MyPlate            Stacia Alicea PA-C  5/25/2022    "

## 2022-05-25 NOTE — NURSING NOTE
Chief Complaint   Patient presents with     Physical     fasting         Pre-visit Screening:  Immunizations:  up to date  Colonoscopy:  is up to date  Mammogram: is up to date  Asthma Action Test/Plan:  LOPEZ  PHQ9:  NA phq2 done today  GAD7:  NA  Questioned patient about current smoking habits Pt. has never smoked.  Ok to leave detailed message on voice mail for today's visit only Yes, phone # [phone]

## 2022-05-27 LAB
CLINICAL HISTORY - QUEST: NORMAL
COMMENT - QUEST: NORMAL
CYTOTECHNOLOGIST - QUEST: NORMAL
DESCRIPTIVE DIAGNOSIS - QUEST: NORMAL
HPV MRNA E6/E7: NOT DETECTED
LAST PAP DX - QUEST: NORMAL
LMP - QUEST: NORMAL
PREV BX DX - QUEST: NORMAL
SOURCE: NORMAL
STATEMENT OF ADEQUACY - QUEST: NORMAL

## 2022-07-06 ENCOUNTER — OFFICE VISIT (OUTPATIENT)
Dept: FAMILY MEDICINE | Facility: CLINIC | Age: 62
End: 2022-07-06

## 2022-07-06 VITALS
BODY MASS INDEX: 26.03 KG/M2 | OXYGEN SATURATION: 96 % | HEART RATE: 57 BPM | WEIGHT: 162 LBS | DIASTOLIC BLOOD PRESSURE: 76 MMHG | SYSTOLIC BLOOD PRESSURE: 122 MMHG | HEIGHT: 66 IN | TEMPERATURE: 97.8 F

## 2022-07-06 DIAGNOSIS — Z12.83 SCREENING FOR SKIN CANCER: ICD-10-CM

## 2022-07-06 DIAGNOSIS — H90.3 BILATERAL SENSORINEURAL HEARING LOSS: Primary | ICD-10-CM

## 2022-07-06 DIAGNOSIS — Z86.69 HISTORY OF OTOSCLEROSIS: ICD-10-CM

## 2022-07-06 PROCEDURE — 99213 OFFICE O/P EST LOW 20 MIN: CPT | Performed by: PHYSICIAN ASSISTANT

## 2022-07-06 NOTE — NURSING NOTE
Chief Complaint   Patient presents with     Ear Problem     Pt feels that her hearing is changing and having different sounds, possibly plugged with wax.         Pre-visit Screening:  Immunizations:  up to date  Colonoscopy:  is up to date  Mammogram: is up to date  Asthma Action Test/Plan:  NA  PHQ9:  NA  GAD7:  NA  Questioned patient about current smoking habits Pt. has never smoked.  Ok to leave detailed message on voice mail for today's visit only Yes, phone # [phone]

## 2022-07-06 NOTE — PROGRESS NOTES
"CC: Hearing change    History:  Hearing change:  Had hearing loss 15-20 years go, and worked with ENT Dr. Roberts through [facility] and ended with bilateral stapedectomy first on right ear then on left ear for otosclerosis. This resulted in adequate hearing. Now in the past couple years has been getting some mild hearing changes with pressure, ringing, echo, humming with both ears, worse on right side. In the past month, has been worse. Daughter is audiologist so did a hearing test and found right sided hearing loss. Jaki does have allergies/chronic sinus symptoms year round, and takes Tylenol Sinus, and Flonase daily.     PMH, MEDICATIONS, ALLERGIES, SOCIAL AND FAMILY HISTORY in [facility] and reviewed by me personally.    ROS negative other than the symptoms noted above in the HPI.     Examination   /76 (BP Location: Left arm, Patient Position: Sitting, Cuff Size: Adult Regular)   Pulse 57   Temp 97.8  F (36.6  C) (Temporal)   Ht 1.664 m (5' 5.5\")   Wt 73.5 kg (162 lb)   LMP 03/04/2010   SpO2 96%   BMI 26.55 kg/m       Constitutional: Sitting comfortably, in no acute distress. Vital signs noted  Ears: external canals and TMs free of abnormalities  Nose: patent, without mucosal abnormalities  Mouth and throat: without erythema or lesions of the mucosa  Neck:  no adenopathy, trachea midline and normal to palpation, thyroid normal to palpation  Cardiovascular:  regular rate and rhythm, no murmurs, clicks, or gallops  Respiratory:  normal respiratory rate and rhythm, lungs clear to auscultation  SKIN: No jaundice/pallor/rash.   Psychiatric: mentation appears normal and affect normal/bright      A/P    ICD-10-CM    1. History of otosclerosis  Z86.69 Adult ENT  Referral   2. Bilateral sensorineural hearing loss  H90.3    3. Screening for skin cancer  Z12.83 Adult Dermatology Referral       DISCUSSION:  Hearing loss.:  Hearing loss noted on exam, with Right ear unable to hear 2000 Hz and 1000 " Hz. No other explanation. Given complex history, recommended she be seen by ENT for further evaluation and treatment options. Will send referral.     Also will refer for dermatology exam.     follow up visit: As needed    Stacia Alicea PA-C  [facility]

## 2022-10-09 ENCOUNTER — HEALTH MAINTENANCE LETTER (OUTPATIENT)
Age: 62
End: 2022-10-09

## 2022-10-19 ENCOUNTER — TRANSFERRED RECORDS (OUTPATIENT)
Dept: FAMILY MEDICINE | Facility: CLINIC | Age: 62
End: 2022-10-19

## 2022-12-23 ENCOUNTER — TRANSFERRED RECORDS (OUTPATIENT)
Dept: FAMILY MEDICINE | Facility: CLINIC | Age: 62
End: 2022-12-23

## 2023-01-10 ENCOUNTER — TRANSFERRED RECORDS (OUTPATIENT)
Dept: FAMILY MEDICINE | Facility: CLINIC | Age: 63
End: 2023-01-10

## 2023-04-18 ENCOUNTER — TRANSFERRED RECORDS (OUTPATIENT)
Dept: FAMILY MEDICINE | Facility: CLINIC | Age: 63
End: 2023-04-18

## 2023-07-27 ENCOUNTER — OFFICE VISIT (OUTPATIENT)
Dept: FAMILY MEDICINE | Facility: CLINIC | Age: 63
End: 2023-07-27

## 2023-07-27 VITALS
OXYGEN SATURATION: 98 % | TEMPERATURE: 97 F | WEIGHT: 162 LBS | SYSTOLIC BLOOD PRESSURE: 118 MMHG | DIASTOLIC BLOOD PRESSURE: 80 MMHG | HEIGHT: 66 IN | BODY MASS INDEX: 26.03 KG/M2 | HEART RATE: 61 BPM

## 2023-07-27 DIAGNOSIS — Z13.228 SCREENING FOR METABOLIC DISORDER: ICD-10-CM

## 2023-07-27 DIAGNOSIS — M85.80 OSTEOPENIA, UNSPECIFIED LOCATION: ICD-10-CM

## 2023-07-27 DIAGNOSIS — Z13.220 SCREENING FOR LIPID DISORDERS: ICD-10-CM

## 2023-07-27 DIAGNOSIS — Z00.00 ENCOUNTER FOR GENERAL HEALTH EXAMINATION: Primary | ICD-10-CM

## 2023-07-27 LAB
BUN SERPL-MCNC: 10 MG/DL (ref 7–25)
BUN/CREATININE RATIO: 12.5 (ref 6–32)
CALCIUM SERPL-MCNC: 9.4 MG/DL (ref 8.6–10.3)
CHLORIDE SERPLBLD-SCNC: 104.8 MMOL/L (ref 98–110)
CHOLEST SERPL-MCNC: 197 MG/DL (ref 0–199)
CHOLEST/HDLC SERPL: 3 {RATIO} (ref 0–5)
CO2 SERPL-SCNC: 29.6 MMOL/L (ref 20–32)
CREAT SERPL-MCNC: 0.8 MG/DL (ref 0.6–1.3)
GLUCOSE SERPL-MCNC: 95 MG/DL (ref 60–99)
HDLC SERPL-MCNC: 60 MG/DL (ref 40–150)
LDLC SERPL CALC-MCNC: 116 MG/DL (ref 0–130)
POTASSIUM SERPL-SCNC: 4.25 MMOL/L (ref 3.5–5.3)
SODIUM SERPL-SCNC: 138.4 MMOL/L (ref 135–146)
TRIGL SERPL-MCNC: 106 MG/DL (ref 0–149)

## 2023-07-27 PROCEDURE — 99396 PREV VISIT EST AGE 40-64: CPT | Performed by: PHYSICIAN ASSISTANT

## 2023-07-27 PROCEDURE — 80061 LIPID PANEL: CPT | Performed by: PHYSICIAN ASSISTANT

## 2023-07-27 PROCEDURE — 36415 COLL VENOUS BLD VENIPUNCTURE: CPT | Performed by: PHYSICIAN ASSISTANT

## 2023-07-27 PROCEDURE — 80048 BASIC METABOLIC PNL TOTAL CA: CPT | Performed by: PHYSICIAN ASSISTANT

## 2023-07-27 NOTE — PROGRESS NOTES
Chief Complaint:  Physical Exam    SUBJECTIVE:   Jaki Lopez is a 62 year old female presents for routine health maintenance.    Current concerns: None     Menses are absent    Patient's last menstrual period was 03/02/2010.     Was last Pap smear normal: Yes  Due for mammogram:  No    Body mass index is 26.55 kg/m .    Present exercise habits:  3-5 times/week  Present dietary habits:  eats regular meals and follows a balanced nutrition diet    Calcium intake:  MV plus some in diet  Vit D intake: is taking supplement    Is the patient a smoker? No  If yes, smoking cessation advised and counseling provided.     Cardiovascular risk factors: none    Over the past few weeks, have you felt down or depressed? Little interest or pleasure in doing things? No concerns    If in a relationship are there any Domestic violence concern: No    Last dental appointment:  this year  Last optical appointment:  this year    Was the patient born between [birth year] and has not had Hep C testing?  Has not been tested, declines testing    I have reviewed the following histories: Past Medical History, Past Surgical History, Social History, Family History, Problem List, Medication List and Allergies    Past Medical History:   Diagnosis Date     Allergic rhinitis due to pollen      Atrophic vaginitis 6/19/2013     Family History   Problem Relation Age of Onset     Hypertension Father      Diabetes Type 2  Maternal Grandmother      Colon Cancer No family hx of      Breast Cancer No family hx of      Social History     Socioeconomic History     Marital status:      Spouse name: Not on file     Number of children: 2     Years of education: Not on file     Highest education level: Not on file   Occupational History     Not on file   Tobacco Use     Smoking status: Never     Passive exposure: Never     Smokeless tobacco: Never   Substance and Sexual Activity     Alcohol use: Yes     Comment: rarely, a glass of wine per month     Drug use:  Never     Sexual activity: Yes     Partners: Male     Birth control/protection: Post-menopausal     Comment:    Other Topics Concern     Not on file   Social History Narrative     Not on file     Social Determinants of Health     Financial Resource Strain: Low Risk  (12/10/2018)    Overall Financial Resource Strain (CARDIA)      Difficulty of Paying Living Expenses: Not hard at all   Food Insecurity: No Food Insecurity (12/10/2018)    Hunger Vital Sign      Worried About Running Out of Food in the Last Year: Never true      Ran Out of Food in the Last Year: Never true   Transportation Needs: No Transportation Needs (12/10/2018)    PRAPARE - Transportation      Lack of Transportation (Medical): No      Lack of Transportation (Non-Medical): No   Physical Activity: Not on file   Stress: Not on file   Social Connections: Not on file   Intimate Partner Violence: Not on file   Housing Stability: Not on file     Past Surgical History:   Procedure Laterality Date     BIOPSY OF BREAST, INCISIONAL  01/01/1996    benign adenoma     STAPEDECTOMY Left 2007     ZZC STAPEDECTOMY  03/01/2005    right       ROS:  E/M: NEGATIVE for ear, nose, mouth and throat problems  R: NEGATIVE for significant/chronic cough or SOB  CV: NEGATIVE for chest pain or palpitations  GI: NEGATIVE for abdominal pain, chronic diarrhea or constipation  :  NEGATIVE for dysuria, hematuria or vaginal discharge. No sexual health concerns.       Current Outpatient Medications   Medication     fluticasone (FLONASE) 50 MCG/ACT nasal spray     multivitamin (ONE-DAILY) tablet     VITAMIN E 400 UNIT OR CAPS     No current facility-administered medications for this visit.       Patient Active Problem List    Diagnosis Date Noted     Cervical polyp 05/25/2022     Priority: Medium     12 o'clock. Seen 5/2022.       Chronic rhinitis 05/25/2022     Priority: Medium     ACP (advance care planning) 08/18/2014     Priority: Medium     Advance Care Planning 8/20/2015:  "ACP Review and Resources Provided:  Reviewed chart for advance care plan.  Jaki Lopez has no plan or code status on file. Discussed available resources and provided with information. Confirmed code status reflects current choices pending further ACP discussions.  Confirmed/documented legally designated decision maker(s). Added by Rachel Bland               [facility] 09/17/2013     Priority: Medium     State Tier Level:  Tier 0  Status:  n/a  Care Coordinator:   See Letters for [facility] Care Plan           Atrophic vaginitis 06/19/2013     Priority: Medium     Symptomatic menopausal or female climacteric states 06/19/2013     Priority: Medium     Osteopenia 03/23/2011     Priority: Medium     Otosclerosis 09/28/2002     Priority: Medium     Problem list name updated by automated process. Provider to review       Allergic rhinitis due to pollen      Priority: Medium     Fibroadenosis of breast      Priority: Medium         OBJECTIVE:  /80 (BP Location: Right arm, Patient Position: Sitting, Cuff Size: Adult Large)   Pulse 61   Temp 97  F (36.1  C) (Temporal)   Ht 1.664 m (5' 5.5\")   Wt 73.5 kg (162 lb)   LMP 03/02/2010   SpO2 98%   BMI 26.55 kg/m          General: 62 year old female who appears her stated age. Vital signs noted.  Head: Normocephalic  Eyes: pupils equal round reactive to light and accomodation, extra ocular movements intact  Ears: external canals and TMs free of abnormalities  Nose: patent, without mucosal abnormalities  Mouth and throat: without erythema or lesions of the mucosa  Neck: supple, without adenopathy or thyromegaly  Lungs: clear to auscultation, no wheezing or crackles  Breasts: Declined. No concerns.   Cv: regular rate and rhythm, normal s1 and s2 without murmur or click  Abd: soft, non-tender, no masses, no hepatomegaly or splenomegaly.   (female): Declined. No concerns.   Ms: normal muscle tone & symmetry  Skin: clear to inspection and with no palpable " "abnormalities.  Neuro: sensation and motor function grossly intact; cranial nerves without obvious abnormalities.    ASSESSMENT/PLAN:    Encounter for general health examination  Jaki is doing well today. Will update fasting labs and send MyChart. Ordered dexa scan to compare to 2011. Gave recommendations on daily calcium, vitamin D intake. Weight bearing exercise.     Osteopenia, unspecified location  - Dexa hip/pelvis/spine*; Future  - Radiology Referral; Future    Screening for lipid disorders  - VENOUS COLLECTION  - Lipid Panel (BFP)    Screening for metabolic disorder  - VENOUS COLLECTION  - Basic Metabolic Panel (BFP)     reports that she has never smoked. She has never been exposed to tobacco smoke. She has never used smokeless tobacco.    Estimated body mass index is 26.55 kg/m  as calculated from the following:    Height as of this encounter: 1.664 m (5' 5.5\").    Weight as of this encounter: 73.5 kg (162 lb).      Labs pending:      Fasting glucose      Fasting lipids  Meds Suggested:      Vitamin D       Calcium  Tests Recommended:      Regular Dental Examinations        Eye exam        Mammogram yearly  Behavior Modifications:       Cardiovascular exercise 3 times per week--enough to get your Target Heart rate  Other recommendations:    Health Care directive      Regular breast exam     Encouraged My Chart    Counseling Resources:  ATP IV Guidelines  Pooled Cohorts Equation Calculator  Breast Cancer Risk Calculator  FRAX Risk Assessment  ICSI Preventive Guidelines  Dietary Guidelines for Americans, 2010  USDA's MyPlate      Stacia Alicea PA-C  7/27/2023    "

## 2023-07-27 NOTE — NURSING NOTE
Chief Complaint   Patient presents with     Physical     Fasting cpx         Pre-visit Screening:  Immunizations:  up to date  Colonoscopy:  is up to date  Mammogram: is up to date  Asthma Action Test/Plan:  LOPEZ  PHQ9:  NA phq2 done  GAD7:  NA  Questioned patient about current smoking habits Pt. has never smoked.  Ok to leave detailed message on voice mail for today's visit only Yes, phone # [phone]

## 2023-08-31 DIAGNOSIS — J30.1 CHRONIC SEASONAL ALLERGIC RHINITIS DUE TO POLLEN: ICD-10-CM

## 2023-08-31 RX ORDER — FLUTICASONE PROPIONATE 50 MCG
2 SPRAY, SUSPENSION (ML) NASAL DAILY
Qty: 48 G | Refills: 3 | Status: SHIPPED | OUTPATIENT
Start: 2023-08-31 | End: 2024-08-07

## 2023-08-31 NOTE — TELEPHONE ENCOUNTER
Jaki Lopez is requesting a refill of:    Pending Prescriptions:                       Disp   Refills    fluticasone (FLONASE) 50 MCG/ACT nasal sp*48 g   3            Sig: Spray 2 sprays into both nostrils daily    Please close encounter if RX was sent. Thanks, Jaclyn

## 2024-08-07 ENCOUNTER — OFFICE VISIT (OUTPATIENT)
Dept: FAMILY MEDICINE | Facility: CLINIC | Age: 64
End: 2024-08-07

## 2024-08-07 VITALS
BODY MASS INDEX: 27.16 KG/M2 | HEART RATE: 54 BPM | DIASTOLIC BLOOD PRESSURE: 86 MMHG | RESPIRATION RATE: 20 BRPM | OXYGEN SATURATION: 94 % | WEIGHT: 163 LBS | SYSTOLIC BLOOD PRESSURE: 124 MMHG | HEIGHT: 65 IN

## 2024-08-07 DIAGNOSIS — J30.1 CHRONIC SEASONAL ALLERGIC RHINITIS DUE TO POLLEN: ICD-10-CM

## 2024-08-07 DIAGNOSIS — Z13.220 SCREENING FOR LIPID DISORDERS: ICD-10-CM

## 2024-08-07 DIAGNOSIS — Z00.00 INITIAL MEDICARE ANNUAL WELLNESS VISIT: Primary | ICD-10-CM

## 2024-08-07 DIAGNOSIS — Z13.228 SCREENING FOR METABOLIC DISORDER: ICD-10-CM

## 2024-08-07 PROCEDURE — 90677 PCV20 VACCINE IM: CPT | Performed by: PHYSICIAN ASSISTANT

## 2024-08-07 PROCEDURE — G0009 ADMIN PNEUMOCOCCAL VACCINE: HCPCS | Performed by: PHYSICIAN ASSISTANT

## 2024-08-07 PROCEDURE — G0438 PPPS, INITIAL VISIT: HCPCS | Mod: 25 | Performed by: PHYSICIAN ASSISTANT

## 2024-08-07 RX ORDER — FLUTICASONE PROPIONATE 50 MCG
2 SPRAY, SUSPENSION (ML) NASAL DAILY
Qty: 48 G | Refills: 3 | Status: SHIPPED | OUTPATIENT
Start: 2024-08-07

## 2024-08-07 NOTE — NURSING NOTE
Chief Complaint   Patient presents with    Medicare Visit     Welcome to medicare     Recheck Medication     Needs refills of flonase      Pre-visit Screening:  Immunizations:  not up to date - due for prvnar 20  Colonoscopy:  is up to date  Mammogram: is up to date  Asthma Action Test/Plan:  emile  PHQ9:  phq2 done today   GAD7:  emile  Questioned patient about current smoking habits Pt. has never smoked.  Ok to leave detailed message on voice mail for today's visit only yes, phone # [phone] (home)

## 2024-08-07 NOTE — PROGRESS NOTES
Jaki Lopez is a 63 year old female who presents for Welcome to Medicare Annual Wellness Visit.    Current providers caring for this patient include:  Patient Care Team:  Stacia Alicea PA-C as PCP - General (Family Medicine)  Stacia Alicea PA-C as Assigned PCP    Complete Medical and Social history reviewed with patient, outlined below.    Patient Active Problem List   Diagnosis    Allergic rhinitis due to pollen    Fibroadenosis of breast    Otosclerosis    Osteopenia of multiple sites- repeat dexa 2026    Atrophic vaginitis    Symptomatic menopausal or female climacteric states    ACP (advance care planning)    Cervical polyp    Chronic rhinitis       Past Medical History:   Diagnosis Date    Allergic rhinitis due to pollen     Atrophic vaginitis 6/19/2013       Past Surgical History:   Procedure Laterality Date    BIOPSY OF BREAST, INCISIONAL  01/01/1996    benign adenoma    STAPEDECTOMY Left 2007    ZZC STAPEDECTOMY  03/01/2005    right       Family History   Problem Relation Age of Onset    Hypertension Father     Diabetes Type 2  Maternal Grandmother     Colon Cancer No family hx of     Breast Cancer No family hx of        Social History     Tobacco Use    Smoking status: Never     Passive exposure: Never    Smokeless tobacco: Never   Substance Use Topics    Alcohol use: Yes     Comment: rarely, a glass of wine per month       Diet: regular, low salt/low fat-eats ice cream   Physical Activity: active without specific exercise program-walks almost every day  Depression Screen:    Over the past 2 weeks, patient has felt down, depressed, or hopeless:  No    Over the past 2 weeks, patient has felt little interest or pleasure in doing things: No    Functional ability/Safety screen:  Up and go test (able to get up and walk longer than 30 seconds): Passed  Patient needs assistance with: nothing-fully independent   Patient's home has the following possible safety concerns: none  "identified  Patient has concerns about her hearing:  No  Cognitive Screen  Patient repeats three objects (ball, flag, tree)      Clock drawing test:   NORMAL  Recalls three objects after 3 minutes (ball,flag,tree):                                                                                               recalls 3 objects (3 points)    PATIENT UNABLE TO PERFORM EYE EXAM.    Physical Exam:  /86 (BP Location: Left arm, Patient Position: Sitting, Cuff Size: Adult Large)   Pulse 54   Resp 20   Ht 1.651 m (5' 5\")   Wt 73.9 kg (163 lb)   LMP 03/02/2010   SpO2 94%   BMI 27.12 kg/m     Body mass index is 27.12 kg/m .     End of Life Planning:   Patient currently has an advanced directive: No.  I have verified the patient's ablity to prepare an advanced directive/make health care decisions.  Literature was provided to assist patient in preparing an advanced directive.    Education/Counseling:   Based on review of the above information, the following items were addressed:      Elevated blood pressure - follow-up plans made    Appropriate preventive services were discussed with this patient, including applicable screening as appropriate for cardiovascular disease, diabetes, osteopenia/osteoporosis, and glaucoma.  As appropriate for age/gender, discussed screening for colorectal cancer, prostate cancer, breast cancer, and cervical cancer.       Will update fasting labs when pt returns for fasting lab only visit next week.    Refilled fluticasone that she uses as needed, but getting to be close to year round. Also recommended sinus rinses.     Stacia Alicea PA-C  8/7/2024         "

## 2024-08-13 DIAGNOSIS — Z13.220 SCREENING FOR LIPID DISORDERS: ICD-10-CM

## 2024-08-13 DIAGNOSIS — Z13.228 SCREENING FOR METABOLIC DISORDER: ICD-10-CM

## 2024-08-13 LAB
BUN SERPL-MCNC: 11 MG/DL (ref 7–25)
BUN/CREATININE RATIO: 12 (ref 6–32)
CALCIUM SERPL-MCNC: 9.5 MG/DL (ref 8.6–10.3)
CHLORIDE SERPLBLD-SCNC: 104.4 MMOL/L (ref 98–110)
CHOLEST SERPL-MCNC: 202 MG/DL (ref 0–199)
CHOLEST/HDLC SERPL: 3 {RATIO} (ref 0–5)
CO2 SERPL-SCNC: 29.1 MMOL/L (ref 20–32)
CREAT SERPL-MCNC: 0.91 MG/DL (ref 0.6–1.3)
GLUCOSE SERPL-MCNC: 89 MG/DL (ref 60–99)
HDLC SERPL-MCNC: 62 MG/DL (ref 40–150)
LDLC SERPL CALC-MCNC: 121 MG/DL
POTASSIUM SERPL-SCNC: 3.94 MMOL/L (ref 3.5–5.3)
SODIUM SERPL-SCNC: 139.8 MMOL/L (ref 135–146)
TRIGL SERPL-MCNC: 96 MG/DL (ref 0–149)

## 2024-08-13 PROCEDURE — 36415 COLL VENOUS BLD VENIPUNCTURE: CPT | Performed by: PHYSICIAN ASSISTANT

## 2024-08-13 PROCEDURE — 80048 BASIC METABOLIC PNL TOTAL CA: CPT | Performed by: PHYSICIAN ASSISTANT

## 2024-08-13 PROCEDURE — 80061 LIPID PANEL: CPT | Performed by: PHYSICIAN ASSISTANT
